# Patient Record
Sex: MALE | Race: OTHER | URBAN - METROPOLITAN AREA
[De-identification: names, ages, dates, MRNs, and addresses within clinical notes are randomized per-mention and may not be internally consistent; named-entity substitution may affect disease eponyms.]

---

## 2017-03-16 ENCOUNTER — OFFICE VISIT (OUTPATIENT)
Dept: OTHER | Facility: HOSPITAL | Age: 57
End: 2017-03-16
Attending: EMERGENCY MEDICINE

## 2017-03-16 DIAGNOSIS — Z00.00 ROUTINE GENERAL MEDICAL EXAMINATION AT A HEALTH CARE FACILITY: Primary | ICD-10-CM

## 2017-03-16 LAB — RUBV IGG SER-ACNC: 64.2 IU/ML

## 2017-03-16 PROCEDURE — 86735 MUMPS ANTIBODY: CPT

## 2017-03-16 PROCEDURE — 86480 TB TEST CELL IMMUN MEASURE: CPT

## 2017-03-16 PROCEDURE — 86762 RUBELLA ANTIBODY: CPT

## 2017-03-16 PROCEDURE — 86706 HEP B SURFACE ANTIBODY: CPT

## 2017-03-16 PROCEDURE — 86765 RUBEOLA ANTIBODY: CPT

## 2017-03-16 PROCEDURE — 86787 VARICELLA-ZOSTER ANTIBODY: CPT

## 2017-03-17 LAB
HBV SURFACE AB SER-ACNC: 6.17 MIU/ML (ref ?–10)
HBV SURFACE AG SERPL QL IA: NONREACTIVE
M TB IFN-G CD4+ BCKGRND COR BLD-ACNC: 0.33 IU/ML
M TB IFN-G CD4+ T-CELLS BLD-ACNC: 0.04 IU/ML
M TB TUBERC IFN-G BLD QL: NEGATIVE
M TB TUBERC IGNF/MITOGEN IGNF CONTROL: >10 IU/ML
MEV IGG SER-ACNC: >300 AU/ML (ref 30–?)
MUV IGG SER IA-ACNC: 34.1 AU/ML (ref 11–?)
VZV IGG SER IA-ACNC: 2185 (ref 165–?)

## 2024-11-15 ENCOUNTER — OFFICE VISIT (OUTPATIENT)
Dept: OTOLARYNGOLOGY | Facility: CLINIC | Age: 64
End: 2024-11-15

## 2024-11-15 VITALS — HEIGHT: 68 IN | BODY MASS INDEX: 30.62 KG/M2 | WEIGHT: 202 LBS

## 2024-11-15 DIAGNOSIS — H69.93 DYSFUNCTION OF BOTH EUSTACHIAN TUBES: ICD-10-CM

## 2024-11-15 DIAGNOSIS — H65.04 RECURRENT ACUTE SEROUS OTITIS MEDIA OF RIGHT EAR: ICD-10-CM

## 2024-11-15 DIAGNOSIS — H70.11 CHRONIC MASTOIDITIS, RIGHT EAR: Primary | ICD-10-CM

## 2024-11-15 PROCEDURE — 3008F BODY MASS INDEX DOCD: CPT | Performed by: SPECIALIST

## 2024-11-15 PROCEDURE — 99203 OFFICE O/P NEW LOW 30 MIN: CPT | Performed by: SPECIALIST

## 2024-11-15 PROCEDURE — 92511 NASOPHARYNGOSCOPY: CPT | Performed by: SPECIALIST

## 2024-11-15 RX ORDER — IBUPROFEN 600 MG/1
TABLET, FILM COATED ORAL
COMMUNITY
Start: 2024-10-13

## 2024-11-15 RX ORDER — ATORVASTATIN CALCIUM 40 MG/1
TABLET, FILM COATED ORAL
COMMUNITY
Start: 2024-11-04

## 2024-11-15 RX ORDER — LISINOPRIL 5 MG/1
5 TABLET ORAL DAILY
COMMUNITY

## 2024-11-15 RX ORDER — DEXAMETHASONE SODIUM PHOSPHATE 1 MG/ML
SOLUTION/ DROPS OPHTHALMIC
COMMUNITY
Start: 2024-10-13

## 2024-11-15 RX ORDER — FLUTICASONE FUROATE 50 UG/1
POWDER RESPIRATORY (INHALATION)
COMMUNITY
Start: 2024-08-12

## 2024-11-15 RX ORDER — GLIPIZIDE 10 MG/1
TABLET ORAL
COMMUNITY

## 2024-11-15 RX ORDER — CIPROFLOXACIN HYDROCHLORIDE 3.5 MG/ML
SOLUTION/ DROPS TOPICAL
COMMUNITY
Start: 2024-10-13

## 2024-11-15 RX ORDER — AZITHROMYCIN 250 MG/1
TABLET, FILM COATED ORAL
Qty: 11 TABLET | Refills: 0 | Status: SHIPPED | OUTPATIENT
Start: 2024-11-15

## 2024-11-15 RX ORDER — FLUCONAZOLE 100 MG/1
TABLET ORAL
COMMUNITY
Start: 2024-10-15

## 2024-11-15 RX ORDER — LISINOPRIL 10 MG/1
1 TABLET ORAL DAILY
COMMUNITY

## 2024-11-15 RX ORDER — ALBUTEROL SULFATE 90 UG/1
INHALANT RESPIRATORY (INHALATION)
COMMUNITY
Start: 2024-08-22

## 2024-11-15 RX ORDER — CICLOPIROX OLAMINE 7.7 MG/G
CREAM TOPICAL
COMMUNITY
Start: 2024-10-15

## 2024-11-15 RX ORDER — ENALAPRIL MALEATE 20 MG/1
TABLET ORAL
COMMUNITY
Start: 2024-11-04

## 2024-11-15 RX ORDER — GLIPIZIDE 10 MG/1
TABLET, FILM COATED, EXTENDED RELEASE ORAL
COMMUNITY
Start: 2024-11-04

## 2024-11-16 NOTE — PATIENT INSTRUCTIONS
There was persistent fluid in your right middle ear.  No evidence of perforation on the date of your visit.  Both tympanic membranes were retracted.  I placed you on Zithromycin and asked you to follow-up in 3 weeks time.  You do not need to use drops.

## 2024-11-16 NOTE — PROGRESS NOTES
Balta Bernardo is a 63 year old male.   Chief Complaint   Patient presents with    New Patient    Ear Problem     Patient here for right ear infection f/up from [facility] on 10/13/2024.     HPI:   Patient here had a very painful and draining right ear.  Was seen at [facility].  Was placed on Cipro and dexamethasone drops as well as Augmentin and ibuprofen.  Patient states that the problem that happens about 1 time a year.    Current Outpatient Medications   Medication Sig Dispense Refill    albuterol 108 (90 Base) MCG/ACT Inhalation Aero Soln       atorvastatin 40 MG Oral Tab       enalapril 20 MG Oral Tab       fluconazole 100 MG Oral Tab FAVIAN(1)  TABLETA DIARIA POR LA BOCA      ARNUITY ELLIPTA 50 MCG/ACT Inhalation Aerosol Powder, Breath Activated       glipiZIDE 10 MG Oral Tab take one tab twice a day      glipiZIDE ER 10 MG Oral Tablet 24 Hr       ibuprofen 600 MG Oral Tab       lisinopril 10 MG Oral Tab Take 1 tablet (10 mg total) by mouth daily.      lisinopril 5 MG Oral Tab Take 1 tablet (5 mg total) by mouth daily.      metFORMIN HCl 1000 MG Oral Tab       azithromycin (ZITHROMAX Z-THERESA) 250 MG Oral Tab Take 1 by oral route every day for 10 days. 2 tablets today. 11 tablet 0    Ciclopirox Olamine 0.77 % External Cream APLICAR DIARIA  LOCALMENTE JASMINE LO INDICADO (Patient not taking: Reported on 11/15/2024)      ciprofloxacin 0.3 % Ophthalmic Solution  (Patient not taking: Reported on 11/15/2024)      dexamethasone 0.1 % Ophthalmic Solution  (Patient not taking: Reported on 11/15/2024)        History reviewed. No pertinent past medical history.   Social History:  Social History     Socioeconomic History    Marital status: Unknown   Tobacco Use    Smoking status: Never    Smokeless tobacco: Never   Vaping Use    Vaping status: Never Used   Substance and Sexual Activity    Alcohol use: Not Currently    Drug use: Never        REVIEW OF SYSTEMS:   GENERAL HEALTH: feels well otherwise  GENERAL  : denies fever, chills, sweats, weight loss, weight gain  SKIN: denies any unusual skin lesions or rashes  RESPIRATORY: denies shortness of breath with exertion  NEURO: denies headaches    EXAM:   Ht 5' 8\" (1.727 m)   Wt 202 lb (91.6 kg)   BMI 30.71 kg/m²   System Details   Skin Inspection - Normal.   Constitutional Overall appearance - Normal.   Head/Face Facial features - Normal. Eyebrows - Normal. Skull - Normal.   Eyes Conjunctiva - Right: Normal, Left: Normal. Pupil - Right: Normal, Left: Normal.    Ears Inspection - Right: Normal, Left: Normal.  No mastoid erythema or edema either side  Canal - Right: Normal, Left: Normal.    TM -bilateral retracted tympanic membranes and partial right serous otitis media.   Nasal External nose - Normal.   Consent was obtained.  The nasal cavity was anesthetized with 1% neosynephrine and 4% lidocaine.  The bilateral nares were examined from the nasal vestibule to the nasopharynx.  Areas examined include the nasal floor, sphenoethmoid recess, turbinates, superior, middle, and inferior meatus, the nasal septum, eustation tube and nasopharynx.  All abnormalities are listed in the exam section.  No purulence or polyps noted either side.   Oral/Oropharynx Lips - Normal, Tonsils - Normal, Tongue - Normal    Neck Exam Inspection - Normal. Palpation - Normal. Parotid gland - Normal. Thyroid gland - Normal.   Lymph Detail Submental. Submandibular. Anterior cervical. Posterior cervical. Supraclavicular.  All without enlargement   Psychiatric Orientation - Oriented to time, place, person & situation. Appropriate mood and affect.   Neurological Memory - Normal. Cranial nerves - Cranial nerves II through XII grossly intact.   Nasopharynx No tumors or masses visualized by fiberoptic examination   Larynx      ASSESSMENT AND PLAN:   Chronic right mastoiditis  Reviewed CT scan done at [facility].  This showed a right otomastoiditis no ostia Sarot's and or abscess formation CT scan  done on 10/13/2024.  Patient placed on a 10-day course of azithromycin.  No need to use drops.  Follow-up in 3 weeks time, sooner if problems.    2.  Bilateral eustachian tube dysfunction  Retracted bilateral tympanic membranes    3.  Acute recurrent right serous otitis media  Likely associated with tympanic membrane perforation due to the patient's history of otorrhea.  Perforation is healed.  Patient states that this condition has been recurrent.    The patient indicates understanding of these issues and agrees to the plan.      Teagan Corona MD  11/15/2024  7:19 PM

## 2024-12-06 ENCOUNTER — OFFICE VISIT (OUTPATIENT)
Dept: OTOLARYNGOLOGY | Facility: CLINIC | Age: 64
End: 2024-12-06

## 2024-12-06 DIAGNOSIS — H65.04 RECURRENT ACUTE SEROUS OTITIS MEDIA OF RIGHT EAR: ICD-10-CM

## 2024-12-06 DIAGNOSIS — H69.93 DYSFUNCTION OF BOTH EUSTACHIAN TUBES: ICD-10-CM

## 2024-12-06 DIAGNOSIS — H70.11 CHRONIC MASTOIDITIS, RIGHT EAR: Primary | ICD-10-CM

## 2024-12-06 PROCEDURE — 99213 OFFICE O/P EST LOW 20 MIN: CPT | Performed by: SPECIALIST

## 2024-12-06 RX ORDER — AZITHROMYCIN 250 MG/1
TABLET, FILM COATED ORAL
Qty: 11 TABLET | Refills: 0 | Status: SHIPPED | OUTPATIENT
Start: 2024-12-06

## 2024-12-06 NOTE — PATIENT INSTRUCTIONS
There is a partial resolution of your right serous otitis media.  I was able to equalize both middle ears with positive pressure.  A repeat of azithromycin was sent to the pharmacy.  Follow-up in 3 to 4 weeks time, sooner if problems.

## 2024-12-06 NOTE — PROGRESS NOTES
Balta Bernardo is a 63 year old male.   Chief Complaint   Patient presents with    Follow - Up     Patient is here due to chronic mastoiditis follow up     HPI:   Patient here after being treated with azithromycin for a chronic right otomastoiditis.    Current Outpatient Medications   Medication Sig Dispense Refill    azithromycin (ZITHROMAX Z-THERESA) 250 MG Oral Tab Take 1 by oral route every day for 10 days. 2 tablets today. 11 tablet 0    albuterol 108 (90 Base) MCG/ACT Inhalation Aero Soln       atorvastatin 40 MG Oral Tab       Ciclopirox Olamine 0.77 % External Cream       ciprofloxacin 0.3 % Ophthalmic Solution       dexamethasone 0.1 % Ophthalmic Solution       enalapril 20 MG Oral Tab       fluconazole 100 MG Oral Tab FAVIAN(1)  TABLETA DIARIA POR LA BOCA      ARNUITY ELLIPTA 50 MCG/ACT Inhalation Aerosol Powder, Breath Activated       glipiZIDE 10 MG Oral Tab take one tab twice a day      glipiZIDE ER 10 MG Oral Tablet 24 Hr       ibuprofen 600 MG Oral Tab       lisinopril 10 MG Oral Tab Take 1 tablet (10 mg total) by mouth daily.      lisinopril 5 MG Oral Tab Take 1 tablet (5 mg total) by mouth daily.      metFORMIN HCl 1000 MG Oral Tab         History reviewed. No pertinent past medical history.   Social History:  Social History     Socioeconomic History    Marital status: Unknown   Tobacco Use    Smoking status: Never    Smokeless tobacco: Never   Vaping Use    Vaping status: Never Used   Substance and Sexual Activity    Alcohol use: Not Currently    Drug use: Never        REVIEW OF SYSTEMS:   GENERAL HEALTH: feels well otherwise  GENERAL : denies fever, chills, sweats, weight loss, weight gain  SKIN: denies any unusual skin lesions or rashes  RESPIRATORY: denies shortness of breath with exertion  NEURO: denies headaches    EXAM:   There were no vitals taken for this visit.  System Details   Skin Inspection - Normal.   Constitutional Overall appearance - Normal.   Head/Face Facial features - Normal.  Eyebrows - Normal. Skull - Normal.   Eyes Conjunctiva - Right: Normal, Left: Normal. Pupil - Right: Normal, Left: Normal.    Ears Inspection - Right: No swelling over the mastoid, left: Normal.   Canal -persistent retracted bilateral tympanic membranes.  Right serous otitis media still present.  1 air bubble now visible.  Able to equalize with positive pressure.  TM - Right: Normal, Left: Normal.   Nasal External nose - Normal.   Nasal septum - Normal.  Turbinates - Normal.   Oral/Oropharynx Lips - Normal, Tonsils - Normal, Tongue - Normal    Neck Exam Inspection - Normal. Palpation - Normal. Parotid gland - Normal. Thyroid gland - Normal.   Lymph Detail Submental. Submandibular. Anterior cervical. Posterior cervical. Supraclavicular all without enlargement   Psychiatric Orientation - Oriented to time, place, person & situation. Appropriate mood and affect.   Neurological Memory - Normal. Cranial nerves - Cranial nerves II through XII grossly intact.     ASSESSMENT AND PLAN:   1. Chronic mastoiditis, right ear  Some improvement with the azithromycin as partial clearing was noted as above.  Patient to repeat azithromycin.  Follow-up in 3 to 4 weeks time, sooner if problems.    2. Dysfunction of both eustachian tubes  Retracted as above.  Able to equalize with positive pressure.    3. Recurrent acute serous otitis media of right ear  Unable to use steroids as patient is a diabetic.      The patient indicates understanding of these issues and agrees to the plan.      Teagan Corona MD  12/6/2024  8:10 AM